# Patient Record
Sex: FEMALE | Race: WHITE | NOT HISPANIC OR LATINO | URBAN - METROPOLITAN AREA
[De-identification: names, ages, dates, MRNs, and addresses within clinical notes are randomized per-mention and may not be internally consistent; named-entity substitution may affect disease eponyms.]

---

## 2019-01-13 ENCOUNTER — OFFICE VISIT - HEALTHEAST (OUTPATIENT)
Dept: FAMILY MEDICINE | Facility: CLINIC | Age: 39
End: 2019-01-13

## 2019-01-13 DIAGNOSIS — R07.81 PLEURITIC CHEST PAIN: ICD-10-CM

## 2019-01-13 DIAGNOSIS — R07.81 RIB PAIN ON RIGHT SIDE: ICD-10-CM

## 2019-01-13 DIAGNOSIS — R05.9 COUGH: ICD-10-CM

## 2019-01-13 DIAGNOSIS — R79.89 D-DIMER, ELEVATED: ICD-10-CM

## 2019-01-13 LAB
BASOPHILS # BLD AUTO: 0.1 THOU/UL (ref 0–0.2)
BASOPHILS NFR BLD AUTO: 1 % (ref 0–2)
D DIMER PPP FEU-MCNC: 0.64 FEU UG/ML
EOSINOPHIL # BLD AUTO: 0.3 THOU/UL (ref 0–0.4)
EOSINOPHIL NFR BLD AUTO: 4 % (ref 0–6)
ERYTHROCYTE [DISTWIDTH] IN BLOOD BY AUTOMATED COUNT: 14 % (ref 11–14.5)
HCT VFR BLD AUTO: 45 % (ref 35–47)
HGB BLD-MCNC: 15.5 G/DL (ref 12–16)
LYMPHOCYTES # BLD AUTO: 1.6 THOU/UL (ref 0.8–4.4)
LYMPHOCYTES NFR BLD AUTO: 21 % (ref 20–40)
MCH RBC QN AUTO: 33.4 PG (ref 27–34)
MCHC RBC AUTO-ENTMCNC: 34.4 G/DL (ref 32–36)
MCV RBC AUTO: 97 FL (ref 80–100)
MONOCYTES # BLD AUTO: 0.4 THOU/UL (ref 0–0.9)
MONOCYTES NFR BLD AUTO: 6 % (ref 2–10)
NEUTROPHILS # BLD AUTO: 5.2 THOU/UL (ref 2–7.7)
NEUTROPHILS NFR BLD AUTO: 69 % (ref 50–70)
PLATELET # BLD AUTO: 288 THOU/UL (ref 140–440)
PMV BLD AUTO: 10.1 FL (ref 8.5–12.5)
RBC # BLD AUTO: 4.64 MILL/UL (ref 3.8–5.4)
WBC: 7.5 THOU/UL (ref 4–11)

## 2019-01-13 RX ORDER — ACETAMINOPHEN 500 MG
500 TABLET ORAL
Status: SHIPPED | COMMUNITY
Start: 2013-01-22

## 2021-05-29 ENCOUNTER — RECORDS - HEALTHEAST (OUTPATIENT)
Dept: ADMINISTRATIVE | Facility: CLINIC | Age: 41
End: 2021-05-29

## 2021-06-02 VITALS — WEIGHT: 188.5 LBS

## 2021-06-17 NOTE — PATIENT INSTRUCTIONS - HE
Patient Instructions by Joshua Lofton PA-C at 1/13/2019 10:10 AM     Author: Joshua Lofton PA-C Service: -- Author Type: Physician Assistant    Filed: 1/13/2019 11:58 AM Encounter Date: 1/13/2019 Status: Addendum    : Joshua Lofton PA-C (Physician Assistant)    Related Notes: Original Note by Joshua Lofton PA-C (Physician Assistant) filed at 1/13/2019 11:58 AM       Patient stated that she is unable to get a CT this afternoon and go to the hospital to have the imaging done.  I talked about the risks and benefits of doing the test and not doing the test.  Her d-dimer test was positive but she does not have tachypnea or tachycardia and it is possible that she has an early pneumonia although her white blood cell count is not elevated and the neutrophils are at the high end of normal which could signify an early bacterial infection.  However, the diagnosis is unknown and there are risks.  The patient was made aware of those risks and she verbalized understanding of that.  She would like to return to clinic tomorrow to have a test done at that time.  I advised her she can come to the walk-in clinic tomorrow or with her primary care provider and schedule a chest CT for the elevated d-dimer and chest pain.  Patient voiced understanding of that concern and questions were answered before discharge.      Patient Education     Noncardiac Chest Pain    Based on your visit today, the healthcare provider doesnt know what is causing your chest pain. In most cases, people who come to the emergency department with chest pain dont have a problem with their heart. Instead, the pain is caused by other conditions. It's important for the healthcare team to be sure you are not having a life threatening cause for chest pain such as a heart attack, blood clot in the lungs, collapsed lung, ruptured esophagus, or tearing of the aorta. Once these major causes have been ruled out, you may have further evaluation for non-heart causes of  chest pain. These may be problems with the lungs, muscles, bones, digestive tract, nerves, or mental health.  Lung problems    Inflammation around the lungs (pleurisy)    Collapsed lung (pneumothorax)    Fluid around the lungs (pleural effusion)    Lung cancer (a rare cause of chest pain)  Muscle or bone problems    Inflamed cartilage between the ribs (costochondritis)    Fibromyalgia    Rheumatoid arthritis    Chest wall strain  Digestive system problems    Reflux    Stomach ulcer    Spasms of the esophagus    Gall stones    Gallbladder inflammation  Mental health conditions    Panic or anxiety attacks    Emotional distress  Your condition doesnt seem serious and your pain doesnt appear to be coming from your heart. But sometimes the signs of a serious problem take more time to appear. Watch for the warning signs listed below.  Home care  Follow these guidelines when caring for yourself at home:    Rest today and avoid strenuous activity.    Take any prescribed medicine as directed.  Follow-up care  Follow up with your healthcare provider, or as advised, if you dont start to feel better within 24 hours.  When to seek medical advice  Call your healthcare provider right away if any of these occur:    A change in the type of pain. Call if it feels different, becomes more serious, lasts longer, or begins to spread into your shoulder, arm, neck, jaw, or back.    Shortness of breath    You feel more pain when you breathe    Cough with dark-colored mucus or blood    Weakness, dizziness, or fainting    Fever of 100.4 F (38 C) or higher, or as directed by your healthcare provider    Swelling, pain, or redness in one leg  Date Last Reviewed: 12/1/2016 2000-2017 [employer]. [address] [place] [address], [place] [address]. All rights reserved. This information is not intended as a substitute for professional medical care. Always follow your healthcare professional's instructions.           Patient Education      Uncertain Causes of Chest Pain    Chest pain can happen for a number of reasons. Sometimes the cause can't be determined. If your condition does not seem serious, and your pain does not appear to be coming from your heart, your healthcare provider may recommend watching it closely. Sometimes the signs of a serious problem take more time to appear. Many problems not related to your heart can cause chest pain.These include:    Musculoskeletal. Costochondritis, an inflammation of the tissues around the ribs that can occur from trauma or overuse injuries    Respiratory. Pneumonia, pneumothorax, or pneumonitis (inflammation of the lining of the chest and lungs)    Gastrointestinal. Esophageal reflux, heartburn, or gallbladder disease    Anxiety and panic disorders    Nerve compression and neuritis    Miscellaneous problems such as aortic aneurysm or pulmonary embolism (a blood clot in the lungs)  Home care  After your visit, follow these recommendations:    Rest today and avoid strenuous activity.    Take any prescribed medicine as directed.    Be aware of any recurrent chest pain and notice any changes  Follow-up care  Follow up with your healthcare provider if you do not start to feel better within 24 hours, or as advised.  Call 911  Call 911 if any of these occur:    A change in the type of pain: if it feels different, becomes more severe, lasts longer, or begins to spread into your shoulder, arm, neck, jaw or back    Shortness of breath or increased pain with breathing    Weakness, dizziness, or fainting    Rapid heart beat    Crushing sensation in your chest  When to seek medical advice  Call your healthcare provider right away if any of the following occur:    Cough with dark colored sputum (phlegm) or blood    Fever of 100.4 F (38 C) or higher, or as directed by your healthcare provider    Swelling, pain or redness in one leg    Shortness of breath  Date Last Reviewed: 12/30/2015 2000-2017 [employer]. [address] [place] [address], [place] [address]. All rights reserved. This information is not intended as a substitute for professional medical care. Always follow your healthcare professional's instructions.           Patient Education     Chest Wall Pain: Costochondritis    The chest pain that you have had today is caused by costochondritis. This condition is caused by an inflammation of the cartilage joining your ribs to your breastbone. It is not caused by heart or lung problems. Your healthcare team has made sure that the chest pain you feel is not from a life threatening cause of chest pain such as heart attack, collapsed lung, blood clot in the lung, tear in the aorta, or esophageal rupture. The inflammation may have been brought on by a blow to the chest, lifting heavy objects, intense exercise, or an illness that made you cough and sneeze a lot. It often occurs during times of emotional stress. It can be painful, but it is not dangerous. It usually goes away in 1 to 2 weeks. But it may happen again. Rarely, a more serious condition may cause symptoms similar to costochondritis. Thats why its important to watch for the warning signs listed below.  Home care  Follow these guidelines when caring for yourself at home:    If you feel that emotional stress is a cause of your condition, try to figure out the sources of that stress. It may not be obvious. Learn ways to deal with the stress in your life. This can include regular exercise, muscle relaxation, meditation, or simply taking time out for yourself.    You may use acetaminophen, ibuprofen, or naproxen to control pain, unless another pain medicine was prescribed. If you have liver or kidney disease or ever had a stomach ulcer, talk with your healthcare provider before using these medicines.    You can also help ease pain by using a hot, wet compress or heating pad. Use this with or without a medicated skin cream that helps relieves pain.    Do  stretching exercise as advised by your provider.    Take any prescribed medicines as directed.  Follow-up care  Follow up with your healthcare provider, or as advised, if you do not start to get better in the next 2 days.  When to seek medical advice  Call your healthcare provider right away if any of these occur:    A change in the type of pain. Call if it feels different, becomes more serious, lasts longer, or spreads into your shoulder, arm, neck, jaw, or back.    Shortness of breath or pain gets worse when you breathe    Weakness, dizziness, or fainting    Cough with dark-colored sputum (phlegm) or blood    Abdominal pain    Dark red or black stools    Fever of 100.4 F (38 C) or higher, or as directed by your healthcare provider  Date Last Reviewed: 12/1/2016 2000-2017 [employer]. [address] [place] [address], [place] [address]. All rights reserved. This information is not intended as a substitute for professional medical care. Always follow your healthcare professional's instructions.

## 2021-06-27 NOTE — PROGRESS NOTES
"Progress Notes by Joshua Lofton PA-C at 1/13/2019 10:10 AM     Author: Joshua Lofton PA-C Service: -- Author Type: Physician Assistant    Filed: 1/13/2019 12:19 PM Encounter Date: 1/13/2019 Status: Signed    : Joshua Lofton PA-C (Physician Assistant)       Subjective:      Patient ID: Alexandra Gutierrez is a 38 y.o. female.    Chief Complaint:    HPI  Alexandra Gutierrez is a 38 y.o. female who presents today complaining of 3-week history of respiratory illness with dry nonproductive cough that turned productive over the last week.  Patient is a smoker.  She has had difficulty over the last 3 days with coughing so hard that she is now having rib pain.  In fact, yesterday she felt and heard a \"pop\" and now is having pain at the spinal rib junction on the right thoracic chest wall and radiation to the right mid axillary line or right midclavicular line.  She has not had fever chills or night sweats but she is having difficulty with deep inspiration continued cough and congestion and shortness of breath..    She has no known history of trauma, she is not on oral contraceptives and she has no swelling of the lower extremities no palpitations no known history of dehydration or prolonged stasis. She is drinking fluids and micturating.  No prior history of DVT or PE.    PERC Rule criteria for Pulmonary Embolism:    1. Age < 50  2. Heart rate < 100  3. Oxygen saturation on Room Air > 95%  4. No hemoptysis  5. No estrogen/hormone use  6. No prior DVT or PE  7. No unilateral leg swelling  8. No surgery (or recent trauma) in the last 4 weeks    PERC Rule Score = Zero (0)    If Zero (0) criteria are present there is no need for further workup, as there is less than 2% chance of PE.    If any criteria are positive, the PERC rule cannot be used to rule out PE in this patient.      No past medical history on file.    No past surgical history on file.    No family history on file.    Social History     Tobacco Use   ? Smoking status: " Current Every Day Smoker   ? Smokeless tobacco: Never Used   Substance Use Topics   ? Alcohol use: Not on file   ? Drug use: Not on file       Review of Systems  As above in HPI, otherwise balance of Review of Systems are negative.    Objective:     /90 (Patient Site: Right Arm, Patient Position: Sitting, Cuff Size: Adult Regular)   Pulse 84   Temp 98.6  F (37  C) (Oral)   Resp 16   Wt 188 lb 8 oz (85.5 kg)   LMP 12/22/2018   SpO2 96%   Breastfeeding? No     Physical Exam  General: Patient is resting comfortably no acute distress is afebrile  HEENT: Head is normocephalic atraumatic   eyes are PERRL EOMI sclera anicteric   TMs are clear bilaterally  Throat is with mild pharyngeal wall exudate  No cervical lymphadenopathy present  LUNGS: Lung has prolonged expiratory wheezes as well as noted rhonchi through the mid to lower complete right lung fields  Left lung fields with expiratory prolonged phase and slight wheezes but no rhonchi or rales heard as compared to the left.  HEART: Regular rate and rhythm  Skin: Without rash non-diaphoretic    The visit lasted a total of 40 minutes that I spent face to face with the patient out of a 90 minute office visit. Over 50% of the time was spent counseling, coordinating care, reviewing pathophysiology and treatment options and educating the patient about the management plan.      Lab:  Recent Results (from the past 24 hour(s))   D-dimer, Quantitative   Result Value Ref Range    D-Dimer, Quant 0.64 (H) <=0.50 FEU ug/mL   HM1 (CBC with Diff)   Result Value Ref Range    WBC 7.5 4.0 - 11.0 thou/uL    RBC 4.64 3.80 - 5.40 mill/uL    Hemoglobin 15.5 12.0 - 16.0 g/dL    Hematocrit 45.0 35.0 - 47.0 %    MCV 97 80 - 100 fL    MCH 33.4 27.0 - 34.0 pg    MCHC 34.4 32.0 - 36.0 g/dL    RDW 14.0 11.0 - 14.5 %    Platelets 288 140 - 440 thou/uL    MPV 10.1 8.5 - 12.5 fL    Neutrophils % 69 50 - 70 %    Lymphocytes % 21 20 - 40 %    Monocytes % 6 2 - 10 %    Eosinophils % 4 0 - 6 %     Basophils % 1 0 - 2 %    Neutrophils Absolute 5.2 2.0 - 7.7 thou/uL    Lymphocytes Absolute 1.6 0.8 - 4.4 thou/uL    Monocytes Absolute 0.4 0.0 - 0.9 thou/uL    Eosinophils Absolute 0.3 0.0 - 0.4 thou/uL    Basophils Absolute 0.1 0.0 - 0.2 thou/uL     Imaging    Xr Chest 2 Views    Result Date: 1/13/2019  EXAM DATE:         01/13/2019 [facility] X-RAY CHEST, 2 VIEWS, FRONTAL AND LATERAL 1/13/2019 11:15 AM INDICATION: right rib pain, cough smoking and rhonchi on right COMPARISON: 04/17/2012 FINDINGS: Negative chest.     Xr Ribs Right W Pa Chest    Result Date: 1/13/2019  EXAM DATE:         01/13/2019 [facility] X-RAY RIBS, UNILATERAL, 2 VIEWS 1/13/2019 11:30 AM INDICATION: right rib pain, cough smoking and rhonchi on right COMPARISON: None. FINDINGS: The visualized heart and lungs are negative. Old right rib fracture. No acute fracture seen.       I personally reviewed and discussed findings with the patient.  My own personal interpretation and radiologist will over read x-rays      Assessment:     Procedures    1. Rib pain on right side  HM1(CBC and Differential)    D-dimer, Quantitative    XR Chest 2 Views    XR Ribs Right W PA Chest    HM1 (CBC with Diff)    CTA Chest PE Run    CTA Chest PE Run   2. Pleuritic chest pain  CTA Chest PE Run    CTA Chest PE Run   3. Cough  CTA Chest PE Run    CTA Chest PE Run   4. D-dimer, elevated  CTA Chest PE Run    CTA Chest PE Run       Plan:     1. Rib pain on right side  HM1(CBC and Differential)    D-dimer, Quantitative    XR Chest 2 Views    XR Ribs Right W PA Chest    HM1 (CBC with Diff)    CTA Chest PE Run    CTA Chest PE Run   2. Pleuritic chest pain  CTA Chest PE Run    CTA Chest PE Run   3. Cough  CTA Chest PE Run    CTA Chest PE Run   4. D-dimer, elevated  CTA Chest PE Run    CTA Chest PE Run       Advised the patient that I cannot fully rule out a pulmonary embolism based on the history and physical and patient's symptoms.  PERC  rule was negative.  However this also could be a early pneumonia since she has high and normal of neutrophils.  She has not had fever chills night sweats or shortness of breath.  She has had congestion with smoking as well as her viral illness producing a large amount of off colored phlegm.  My preference is still to image the chest based on the positive d-dimer.  It may also help differentiate an early pneumonia not seen on x-ray.  Had a long conversation with the patient she understood the risks of that and opted not to get a CT scan this afternoon because she did not have support at home and her  was not at home to help with the children or be able to spend time at the hospital.  She is instead requesting that she return to walk-in clinic tomorrow and have the test scheduled tomorrow.  I am working tomorrow and I did offer that as a possibility and if she wants the test to be reordered we would do it at that time.  However I cannot guarantee that there is not any risk or other bad outcome in the interim.  Indication for emergent return to the emergency room was gone over patient voiced understanding.    Patient Instructions   Patient stated that she is unable to get a CT this afternoon and go to the hospital to have the imaging done.  I talked about the risks and benefits of doing the test and not doing the test.  Her d-dimer test was positive but she does not have tachypnea or tachycardia and it is possible that she has an early pneumonia although her white blood cell count is not elevated and the neutrophils are at the high end of normal which could signify an early bacterial infection.  However, the diagnosis is unknown and there are risks.  The patient was made aware of those risks and she verbalized understanding of that.  She would like to return to clinic tomorrow to have a test done at that time.  I advised her she can come to the walk-in clinic tomorrow or with her primary care provider and  schedule a chest CT for the elevated d-dimer and chest pain.  Patient voiced understanding of that concern and questions were answered before discharge.      Patient Education     Noncardiac Chest Pain    Based on your visit today, the healthcare provider doesnt know what is causing your chest pain. In most cases, people who come to the emergency department with chest pain dont have a problem with their heart. Instead, the pain is caused by other conditions. It's important for the healthcare team to be sure you are not having a life threatening cause for chest pain such as a heart attack, blood clot in the lungs, collapsed lung, ruptured esophagus, or tearing of the aorta. Once these major causes have been ruled out, you may have further evaluation for non-heart causes of chest pain. These may be problems with the lungs, muscles, bones, digestive tract, nerves, or mental health.  Lung problems    Inflammation around the lungs (pleurisy)    Collapsed lung (pneumothorax)    Fluid around the lungs (pleural effusion)    Lung cancer (a rare cause of chest pain)  Muscle or bone problems    Inflamed cartilage between the ribs (costochondritis)    Fibromyalgia    Rheumatoid arthritis    Chest wall strain  Digestive system problems    Reflux    Stomach ulcer    Spasms of the esophagus    Gall stones    Gallbladder inflammation  Mental health conditions    Panic or anxiety attacks    Emotional distress  Your condition doesnt seem serious and your pain doesnt appear to be coming from your heart. But sometimes the signs of a serious problem take more time to appear. Watch for the warning signs listed below.  Home care  Follow these guidelines when caring for yourself at home:    Rest today and avoid strenuous activity.    Take any prescribed medicine as directed.  Follow-up care  Follow up with your healthcare provider, or as advised, if you dont start to feel better within 24 hours.  When to seek medical advice  Call your  healthcare provider right away if any of these occur:    A change in the type of pain. Call if it feels different, becomes more serious, lasts longer, or begins to spread into your shoulder, arm, neck, jaw, or back.    Shortness of breath    You feel more pain when you breathe    Cough with dark-colored mucus or blood    Weakness, dizziness, or fainting    Fever of 100.4 F (38 C) or higher, or as directed by your healthcare provider    Swelling, pain, or redness in one leg  Date Last Reviewed: 12/1/2016 2000-2017 The "[employer]. [address] [place] [id number]. All rights reserved. This information is not intended as a substitute for professional medical care. Always follow your healthcare professional's instructions.           Patient Education     Uncertain Causes of Chest Pain    Chest pain can happen for a number of reasons. Sometimes the cause can't be determined. If your condition does not seem serious, and your pain does not appear to be coming from your heart, your healthcare provider may recommend watching it closely. Sometimes the signs of a serious problem take more time to appear. Many problems not related to your heart can cause chest pain.These include:    Musculoskeletal. Costochondritis, an inflammation of the tissues around the ribs that can occur from trauma or overuse injuries    Respiratory. Pneumonia, pneumothorax, or pneumonitis (inflammation of the lining of the chest and lungs)    Gastrointestinal. Esophageal reflux, heartburn, or gallbladder disease    Anxiety and panic disorders    Nerve compression and neuritis    Miscellaneous problems such as aortic aneurysm or pulmonary embolism (a blood clot in the lungs)  Home care  After your visit, follow these recommendations:    Rest today and avoid strenuous activity.    Take any prescribed medicine as directed.    Be aware of any recurrent chest pain and notice any changes  Follow-up care  Follow up with your healthcare  provider if you do not start to feel better within 24 hours, or as advised.  Call 911  Call 911 if any of these occur:    A change in the type of pain: if it feels different, becomes more severe, lasts longer, or begins to spread into your shoulder, arm, neck, jaw or back    Shortness of breath or increased pain with breathing    Weakness, dizziness, or fainting    Rapid heart beat    Crushing sensation in your chest  When to seek medical advice  Call your healthcare provider right away if any of the following occur:    Cough with dark colored sputum (phlegm) or blood    Fever of 100.4 F (38 C) or higher, or as directed by your healthcare provider    Swelling, pain or redness in one leg    Shortness of breath  Date Last Reviewed: 12/30/2015 2000-2017 [employer]. [address] [place] [id number]. All rights reserved. This information is not intended as a substitute for professional medical care. Always follow your healthcare professional's instructions.           Patient Education     Chest Wall Pain: Costochondritis    The chest pain that you have had today is caused by costochondritis. This condition is caused by an inflammation of the cartilage joining your ribs to your breastbone. It is not caused by heart or lung problems. Your healthcare team has made sure that the chest pain you feel is not from a life threatening cause of chest pain such as heart attack, collapsed lung, blood clot in the lung, tear in the aorta, or esophageal rupture. The inflammation may have been brought on by a blow to the chest, lifting heavy objects, intense exercise, or an illness that made you cough and sneeze a lot. It often occurs during times of emotional stress. It can be painful, but it is not dangerous. It usually goes away in 1 to 2 weeks. But it may happen again. Rarely, a more serious condition may cause symptoms similar to costochondritis. Thats why its important to watch for the warning signs  listed below.  Home care  Follow these guidelines when caring for yourself at home:    If you feel that emotional stress is a cause of your condition, try to figure out the sources of that stress. It may not be obvious. Learn ways to deal with the stress in your life. This can include regular exercise, muscle relaxation, meditation, or simply taking time out for yourself.    You may use acetaminophen, ibuprofen, or naproxen to control pain, unless another pain medicine was prescribed. If you have liver or kidney disease or ever had a stomach ulcer, talk with your healthcare provider before using these medicines.    You can also help ease pain by using a hot, wet compress or heating pad. Use this with or without a medicated skin cream that helps relieves pain.    Do stretching exercise as advised by your provider.    Take any prescribed medicines as directed.  Follow-up care  Follow up with your healthcare provider, or as advised, if you do not start to get better in the next 2 days.  When to seek medical advice  Call your healthcare provider right away if any of these occur:    A change in the type of pain. Call if it feels different, becomes more serious, lasts longer, or spreads into your shoulder, arm, neck, jaw, or back.    Shortness of breath or pain gets worse when you breathe    Weakness, dizziness, or fainting    Cough with dark-colored sputum (phlegm) or blood    Abdominal pain    Dark red or black stools    Fever of 100.4 F (38 C) or higher, or as directed by your healthcare provider  Date Last Reviewed: 12/1/2016 2000-2017 [employer]. [address] [place] [address], [place] [address]. All rights reserved. This information is not intended as a substitute for professional medical care. Always follow your healthcare professional's instructions.